# Patient Record
Sex: FEMALE | Race: WHITE | URBAN - METROPOLITAN AREA
[De-identification: names, ages, dates, MRNs, and addresses within clinical notes are randomized per-mention and may not be internally consistent; named-entity substitution may affect disease eponyms.]

---

## 2017-01-26 ENCOUNTER — ED HISTORICAL/CONVERTED ENCOUNTER (OUTPATIENT)
Dept: OTHER | Age: 32
End: 2017-01-26

## 2017-03-05 ENCOUNTER — ED HISTORICAL/CONVERTED ENCOUNTER (OUTPATIENT)
Dept: OTHER | Age: 32
End: 2017-03-05

## 2017-06-29 ENCOUNTER — OP HISTORICAL/CONVERTED ENCOUNTER (OUTPATIENT)
Dept: OTHER | Age: 32
End: 2017-06-29

## 2018-01-16 ENCOUNTER — ED HISTORICAL/CONVERTED ENCOUNTER (OUTPATIENT)
Dept: OTHER | Age: 33
End: 2018-01-16

## 2019-07-05 ENCOUNTER — ED HISTORICAL/CONVERTED ENCOUNTER (OUTPATIENT)
Dept: OTHER | Age: 34
End: 2019-07-05

## 2022-09-12 ENCOUNTER — TRANSCRIBE ORDER (OUTPATIENT)
Dept: SCHEDULING | Age: 37
End: 2022-09-12

## 2022-09-12 DIAGNOSIS — N63.0 LUMP OR MASS IN BREAST: Primary | ICD-10-CM

## 2022-09-22 ENCOUNTER — HOSPITAL ENCOUNTER (OUTPATIENT)
Dept: ULTRASOUND IMAGING | Age: 37
Discharge: HOME | End: 2022-09-22
Attending: NURSE PRACTITIONER
Payer: OTHER GOVERNMENT

## 2022-09-22 ENCOUNTER — HOSPITAL ENCOUNTER (OUTPATIENT)
Dept: MAMMOGRAPHY | Age: 37
Discharge: HOME | End: 2022-09-22
Attending: NURSE PRACTITIONER
Payer: OTHER GOVERNMENT

## 2022-09-22 DIAGNOSIS — N63.0 LUMP OR MASS IN BREAST: ICD-10-CM

## 2022-09-22 PROCEDURE — 76642 ULTRASOUND BREAST LIMITED: CPT

## 2022-09-22 PROCEDURE — 77062 BREAST TOMOSYNTHESIS BI: CPT

## 2023-10-03 ENCOUNTER — APPOINTMENT (OUTPATIENT)
Dept: URBAN - METROPOLITAN AREA CLINIC 278 | Age: 38
Setting detail: DERMATOLOGY
End: 2023-10-03

## 2023-10-03 DIAGNOSIS — Z71.89 OTHER SPECIFIED COUNSELING: ICD-10-CM

## 2023-10-03 DIAGNOSIS — L82.0 INFLAMED SEBORRHEIC KERATOSIS: ICD-10-CM

## 2023-10-03 DIAGNOSIS — L21.8 OTHER SEBORRHEIC DERMATITIS: ICD-10-CM

## 2023-10-03 PROBLEM — D23.9 OTHER BENIGN NEOPLASM OF SKIN, UNSPECIFIED: Status: ACTIVE | Noted: 2023-10-03

## 2023-10-03 PROCEDURE — OTHER ADDITIONAL NOTES: OTHER

## 2023-10-03 PROCEDURE — OTHER LIQUID NITROGEN: OTHER

## 2023-10-03 PROCEDURE — OTHER COUNSELING: OTHER

## 2023-10-03 PROCEDURE — 99204 OFFICE O/P NEW MOD 45 MIN: CPT | Mod: 25

## 2023-10-03 PROCEDURE — 17110 DESTRUCT B9 LESION 1-14: CPT

## 2023-10-03 PROCEDURE — OTHER MIPS QUALITY: OTHER

## 2023-10-03 PROCEDURE — OTHER PRESCRIPTION: OTHER

## 2023-10-03 RX ORDER — KETOCONAZOLE 20 MG/ML
SHAMPOO, SUSPENSION TOPICAL QD PRN
Qty: 120 | Refills: 3 | Status: ERX | COMMUNITY
Start: 2023-10-03

## 2023-10-03 ASSESSMENT — LOCATION ZONE DERM
LOCATION ZONE: FACE
LOCATION ZONE: SCALP
LOCATION ZONE: TRUNK

## 2023-10-03 ASSESSMENT — LOCATION SIMPLE DESCRIPTION DERM
LOCATION SIMPLE: LEFT LOWER BACK
LOCATION SIMPLE: POSTERIOR SCALP
LOCATION SIMPLE: LEFT CHEEK

## 2023-10-03 ASSESSMENT — SEVERITY ASSESSMENT: HOW SEVERE IS THIS PATIENT'S CONDITION?: MILD

## 2023-10-03 ASSESSMENT — LOCATION DETAILED DESCRIPTION DERM
LOCATION DETAILED: LEFT SUPERIOR PREAURICULAR CHEEK
LOCATION DETAILED: POSTERIOR MID-PARIETAL SCALP
LOCATION DETAILED: LEFT INFERIOR LATERAL MIDBACK

## 2023-10-03 NOTE — HPI: FULL BODY SKIN EXAMINATION
What Type Of Note Output Would You Prefer (Optional)?: Bullet Format
What Is The Reason For Today's Visit?: Full Body Skin Examination
What Is The Reason For Today's Visit? (Being Monitored For X): concerning skin lesions on a periodic basis
Additional History: Patient states that she has two areas that are raised and rough located on left temple and left lateral back

## 2023-10-03 NOTE — PROCEDURE: ADDITIONAL NOTES
ALLOPURINOL 100 MG TABLET  Last Written Prescription Date:  1/6/2021  Last Fill Quantity: 120,   # refills: 0  Last Office Visit :8/24/2020  Future Office visit:  None    Routing refill request to provider for review/approval because:  Abnormal Uric Acid Levels and Creatinine Levels  Refer to clinic for review     Recent Labs   Lab Test 01/17/20  0334   URIC 9.1*   If level is 6mg/dL or greater, ok to refill one time and refer to provider.         Normal serum creatinine on file in the past 12 months          Recent Labs   Lab Test 05/15/20  1028 01/15/20  2207 01/15/20  2207   CR 1.18*   < >  --    CREAT  --   --  0.9             Brigida Velsaquez RN  Central Triage Red Flags/Med Refills    
Render Risk Assessment In Note?: no
Detail Level: Simple
Additional Notes: Can address hair loss at f/u to see if it has improved with treatment of inflammation.

## 2024-03-05 ENCOUNTER — APPOINTMENT (OUTPATIENT)
Dept: URBAN - METROPOLITAN AREA CLINIC 278 | Age: 39
Setting detail: DERMATOLOGY
End: 2024-03-06

## 2024-03-05 DIAGNOSIS — L57.0 ACTINIC KERATOSIS: ICD-10-CM

## 2024-03-05 DIAGNOSIS — L28.0 LICHEN SIMPLEX CHRONICUS: ICD-10-CM

## 2024-03-05 DIAGNOSIS — Z71.89 OTHER SPECIFIED COUNSELING: ICD-10-CM

## 2024-03-05 PROCEDURE — 99214 OFFICE O/P EST MOD 30 MIN: CPT | Mod: 25

## 2024-03-05 PROCEDURE — OTHER MIPS QUALITY: OTHER

## 2024-03-05 PROCEDURE — OTHER PRESCRIPTION: OTHER

## 2024-03-05 PROCEDURE — 17000 DESTRUCT PREMALG LESION: CPT

## 2024-03-05 PROCEDURE — OTHER COUNSELING: OTHER

## 2024-03-05 PROCEDURE — OTHER REASSURANCE: OTHER

## 2024-03-05 PROCEDURE — OTHER LIQUID NITROGEN: OTHER

## 2024-03-05 RX ORDER — TACROLIMUS 1 MG/G
OINTMENT TOPICAL
Qty: 30 | Refills: 1 | Status: ERX | COMMUNITY
Start: 2024-03-05

## 2024-03-05 ASSESSMENT — LOCATION DETAILED DESCRIPTION DERM
LOCATION DETAILED: RIGHT MEDIAL CANTHUS
LOCATION DETAILED: LEFT MEDIAL CANTHUS
LOCATION DETAILED: RIGHT INFERIOR CENTRAL MALAR CHEEK

## 2024-03-05 ASSESSMENT — LOCATION SIMPLE DESCRIPTION DERM
LOCATION SIMPLE: LEFT EYELID
LOCATION SIMPLE: RIGHT EYELID
LOCATION SIMPLE: RIGHT CHEEK

## 2024-03-05 ASSESSMENT — LOCATION ZONE DERM
LOCATION ZONE: FACE
LOCATION ZONE: EYELID

## 2024-03-05 NOTE — PROCEDURE: LIQUID NITROGEN
Show Applicator Variable?: Yes
Number Of Freeze-Thaw Cycles: 2 freeze-thaw cycles
Render Note In Bullet Format When Appropriate: No
Duration Of Freeze Thaw-Cycle (Seconds): 10
Post-Care Instructions: I reviewed with the patient in detail post-care instructions. Patient is to wear sunprotection, and avoid picking at any of the treated lesions. Pt may apply Vaseline to crusted or scabbing areas.
Detail Level: Detailed
Consent: The patient's consent was obtained including but not limited to risks of crusting, scabbing, blistering, scarring, darker or lighter pigmentary change, recurrence, incomplete removal and infection.

## 2025-05-22 NOTE — H&P
performed.    COMPARISON:  None    BREAST COMPOSITION: The breast tissue is heterogeneously dense, which could  obscure detection of small masses (approximately 51-75% glandular).     FINDINGS:    Mammography:  Bilateral digital diagnostic mammography was performed, and is  interpreted in conjunction with a computer assisted detection (CAD) system. Spot  compression views of the LEFT breast in the area of clinical concern confirm the  presence of a mass. It is oval with circumscribed margins and isodense to breast  parenchyma situated at the 9:00 subareolar position.    There is a second mass in the upper outer quadrant of the LEFT breast which is  oval with circumscribed margins and isodense to breast parenchyma. There are a  few associated punctate calcifications.    On the RIGHT, there are biopsy site markers in the upper outer quadrant. There  is a mass in the upper outer quadrant of the RIGHT breast not associated with a  biopsy clip which is oval with circumscribed and partially obscured margins.    Ultrasound: On physical examination of the LEFT breast, I palpate a firm, mobile  nodule near the 10:00 subareolar position. On sonography, there is a simple cyst  at the 9-10:00 subareolar position which measures 1.5 x 1.1 x 1.4 cm  corresponding to the site of clinical concern.    On sonography of the LEFT breast upper outer quadrant, there is a simple cyst  measuring 2.0 x 1.6 x 2.1 cm.    On sonography of the RIGHT breast upper outer quadrant, there is a hypoechoic  mass with circumscribed margins measuring 1.1 x 0.5 x 0.8 cm at the 10:00  position 3 cm from the RIGHT nipple. It demonstrates a parallel orientation.  Impression: 1. LEFT breast: BI-RADS Category 2 - Benign findings. No radiographic evidence  of malignancy in the LEFT breast. There are simple cysts.    2. RIGHT breast: BI-RADS Category 2 - Benign findings. No radiographic evidence  of malignancy in the RIGHT breast. There is a probable

## 2025-05-22 NOTE — H&P (VIEW-ONLY)
This 40 y.o. year old female presents with complaints of  chronic right foot pain.          Conservative measures have been exhausted prior to the decision for surgery. Patient has discussed the risks, alternatives, and benefits of the surgery with surgeon and has elected to proceed with surgical intervention.    Type of surgery : Procedure(s) (LRB):  HALLUX VALGUS REPAIR WITH DOUBLE OSTEOTOMY - RIGHT FOOT, 2ND METATARSAL OSTEOTOMY - RIGHT FOOT (Right)  Date of procedure:  6/6/25  Surgeon: Primary: Chong Negrete DPM     PCP: Soniya Peralta APRN - NP   Specialists: Dr. Bourgeois (Psychiatry)      LMP (If applicable): 5/15/25      Hx of Psychological Disorders: Yes    Hx of Seizure: No      Hx of Cancer: No    Hx of Autoimmune Disorders: No    Hx of Anemias: No    Hx of Blood Transfusions: No    Personal Hx of Blood Clotting Disorder/DVT/PE: No    Family Hx of Blood Clotting Disorder/DVT/PE: No    Hx of Hormone Disorders: No    Hx of DM: No     Hx of CVA/TIA: No     Hearing Aids: No     Implantable Devices (Defibrillator, Pacemaker, nerve stimulator, portacath, insulin pump, continuous glucose monitor, etc): No        AICD/Pacemaker (If so, has it been interrogated and/or battery change within the last 6 months?)? No        Coronary Artery Stent Placement within last 6 months? No       Hx of Rheumatic Fever/HTN/HLD/CAD/CHF/Cardiomyopathy/Arrhythmia/Heart Valve Abnormalities/Aneurysm: No    Hx of TB/Asthma/COPD/CAREY: No    Hx of GERD: No     Hx of Liver Disease: No     Known Kidney Disease (including dialysis recipient)? No      Frequent UTIs: No     Abx used in the past: N/A    Requires Diflucan after abx: No     History of MRSA/MSSA: Yes-MRSA    Recent ED/Hospitalizations/Urgent Care visits: No        Acute exacerbations/Infections: No        Anesthesia type: MAC    Prior reactions to anesthesia:  Yes-episode of hypoxia     Personal History of Pseudocholinesterase Deficiency: No    Family History of

## 2025-05-28 ENCOUNTER — HOSPITAL ENCOUNTER (OUTPATIENT)
Facility: HOSPITAL | Age: 40
Discharge: HOME | End: 2025-05-31

## 2025-05-28 VITALS
BODY MASS INDEX: 32.38 KG/M2 | HEIGHT: 67 IN | DIASTOLIC BLOOD PRESSURE: 88 MMHG | OXYGEN SATURATION: 98 % | RESPIRATION RATE: 16 BRPM | TEMPERATURE: 97.7 F | SYSTOLIC BLOOD PRESSURE: 135 MMHG | WEIGHT: 206.3 LBS | HEART RATE: 91 BPM

## 2025-05-28 RX ORDER — VENLAFAXINE HYDROCHLORIDE 150 MG/1
150 CAPSULE, EXTENDED RELEASE ORAL EVERY MORNING
COMMUNITY

## 2025-05-28 RX ORDER — VENLAFAXINE 37.5 MG/1
37.5 TABLET ORAL EVERY MORNING
COMMUNITY

## 2025-05-28 RX ORDER — LAMOTRIGINE 25 MG/1
25 TABLET ORAL EVERY MORNING
COMMUNITY

## 2025-05-28 RX ORDER — CLONAZEPAM 0.5 MG/1
0.5 TABLET ORAL 2 TIMES DAILY PRN
COMMUNITY

## 2025-05-28 ASSESSMENT — ENCOUNTER SYMPTOMS
NAUSEA: 0
VOICE CHANGE: 0
WHEEZING: 0
COLOR CHANGE: 0
RHINORRHEA: 0
CHOKING: 0
COUGH: 0
TROUBLE SWALLOWING: 0
STRIDOR: 0
CHEST TIGHTNESS: 0
SHORTNESS OF BREATH: 0
SORE THROAT: 0
APNEA: 0
CONSTIPATION: 0
SINUS PRESSURE: 0
SINUS PAIN: 0

## 2025-05-28 ASSESSMENT — PAIN DESCRIPTION - LOCATION: LOCATION: FOOT

## 2025-05-28 ASSESSMENT — PAIN SCALES - GENERAL: PAINLEVEL_OUTOF10: 4

## 2025-05-28 ASSESSMENT — PAIN DESCRIPTION - PAIN TYPE: TYPE: CHRONIC PAIN

## 2025-05-28 ASSESSMENT — PAIN DESCRIPTION - ORIENTATION: ORIENTATION: RIGHT

## 2025-05-28 ASSESSMENT — PAIN - FUNCTIONAL ASSESSMENT: PAIN_FUNCTIONAL_ASSESSMENT: PREVENTS OR INTERFERES SOME ACTIVE ACTIVITIES AND ADLS

## 2025-05-28 NOTE — DISCHARGE INSTRUCTIONS
[facility]                   [address] [place] [address]   MAIN OR                                  [phone]   MAIN PRE OP                          [phone]                                                                                AMBULATORY PRE OP          [phone]  PRE-ADMISSION TESTING    [phone]     Surgery Date:  Friday 6/6/2025      INSTRUCTIONS BEFORE YOUR SURGERY   Arrival Time [facility] Pre-op staff will call you between 3 and 7pm the day before your surgery with your arrival time. If your surgery is on a Monday, they will call you the Friday before. If it's after 7pm the day prior to surgery and you have not received a time yet, please call [phone].   Where to Check In   Come through the Main Hospital entrance. Take the elevators on the left side of the lobby to the 2nd floor. The admitting desk will be on your right.     Please bring the following items to register:  photo ID, insurance card, co-pay if needed, medical directive, DNR, and/or POA if applicable.     Free [employer] parking is available 7am to 5pm.   Food Drink Alcohol Marijuana    No food or drink (gum, mints, coffee, juice, etc) after midnight the night before surgery.  EXCEPTION:  You may drink WATER ONLY up until 2 hours prior to your surgery time. Please do not add anything to your water as this may result in your surgery being postponed.       No alcohol (beer, wine, liquor) or marijuana 24 hours before or after surgery.      PRE-OP Medication Instructions      MEDICATIONS TO TAKE THE MORNING OF SURGERY WITH WATER:   Venlafaxine, lamotrigine, and clonazepam if needed     Medications to STOP 7 Days Before Surgery Non-Steroidal anti-inflammatory Drugs (NSAID's): for example: Ibuprofen, Advil, Motrin, Naproxen, Aleve, Diclofenac  Aspirin and Aspirin containing products (BC Powder, Excedrin, etc.)  Weight loss and/or diabetic GLP1 medications (Wegovy, Ozempic,

## 2025-05-30 LAB
BACTERIA SPEC CULT: NORMAL
BACTERIA SPEC CULT: NORMAL
SERVICE CMNT-IMP: NORMAL

## 2025-06-06 ENCOUNTER — HOSPITAL ENCOUNTER (OUTPATIENT)
Facility: HOSPITAL | Age: 40
Setting detail: OUTPATIENT SURGERY
Discharge: HOME | End: 2025-06-06
Attending: PODIATRIST | Admitting: PODIATRIST
Payer: OTHER GOVERNMENT

## 2025-06-06 ENCOUNTER — APPOINTMENT (OUTPATIENT)
Facility: HOSPITAL | Age: 40
End: 2025-06-06
Attending: PODIATRIST
Payer: OTHER GOVERNMENT

## 2025-06-06 ENCOUNTER — ANESTHESIA (OUTPATIENT)
Facility: HOSPITAL | Age: 40
End: 2025-06-06
Payer: OTHER GOVERNMENT

## 2025-06-06 ENCOUNTER — HOSPITAL ENCOUNTER (OUTPATIENT)
Facility: HOSPITAL | Age: 40
Discharge: HOME | End: 2025-06-09

## 2025-06-06 ENCOUNTER — ANESTHESIA EVENT (OUTPATIENT)
Facility: HOSPITAL | Age: 40
End: 2025-06-06
Payer: OTHER GOVERNMENT

## 2025-06-06 VITALS
BODY MASS INDEX: 31.97 KG/M2 | WEIGHT: 203.71 LBS | HEIGHT: 67 IN | DIASTOLIC BLOOD PRESSURE: 68 MMHG | SYSTOLIC BLOOD PRESSURE: 104 MMHG | TEMPERATURE: 98.6 F | HEART RATE: 71 BPM | RESPIRATION RATE: 16 BRPM | OXYGEN SATURATION: 98 %

## 2025-06-06 LAB — HCG UR QL: NEGATIVE

## 2025-06-06 PROCEDURE — 2709999900 HC NON-CHARGEABLE SUPPLY: Performed by: PODIATRIST

## 2025-06-06 PROCEDURE — 7100000010 HC PHASE II RECOVERY - FIRST 15 MIN: Performed by: PODIATRIST

## 2025-06-06 PROCEDURE — 6360000002 HC RX W HCPCS: Performed by: PODIATRIST

## 2025-06-06 PROCEDURE — 2500000003 HC RX 250 WO HCPCS: Performed by: NURSE ANESTHETIST, CERTIFIED REGISTERED

## 2025-06-06 PROCEDURE — 2720000010 HC SURG SUPPLY STERILE: Performed by: PODIATRIST

## 2025-06-06 PROCEDURE — 7100000000 HC PACU RECOVERY - FIRST 15 MIN: Performed by: PODIATRIST

## 2025-06-06 PROCEDURE — 81025 URINE PREGNANCY TEST: CPT

## 2025-06-06 PROCEDURE — C1713 ANCHOR/SCREW BN/BN,TIS/BN: HCPCS | Performed by: PODIATRIST

## 2025-06-06 PROCEDURE — 6360000002 HC RX W HCPCS: Performed by: NURSE ANESTHETIST, CERTIFIED REGISTERED

## 2025-06-06 PROCEDURE — 3700000000 HC ANESTHESIA ATTENDED CARE: Performed by: PODIATRIST

## 2025-06-06 PROCEDURE — 7100000001 HC PACU RECOVERY - ADDTL 15 MIN: Performed by: PODIATRIST

## 2025-06-06 PROCEDURE — 3700000001 HC ADD 15 MINUTES (ANESTHESIA): Performed by: PODIATRIST

## 2025-06-06 PROCEDURE — 7100000011 HC PHASE II RECOVERY - ADDTL 15 MIN: Performed by: PODIATRIST

## 2025-06-06 PROCEDURE — 2580000003 HC RX 258: Performed by: ANESTHESIOLOGY

## 2025-06-06 PROCEDURE — 3600000003 HC SURGERY LEVEL 3 BASE: Performed by: PODIATRIST

## 2025-06-06 PROCEDURE — 3600000013 HC SURGERY LEVEL 3 ADDTL 15MIN: Performed by: PODIATRIST

## 2025-06-06 DEVICE — BEVELED SCREW
Type: IMPLANTABLE DEVICE | Site: FOOT | Status: FUNCTIONAL
Brand: MIS BEVELED SCREW

## 2025-06-06 RX ORDER — ONDANSETRON 2 MG/ML
INJECTION INTRAMUSCULAR; INTRAVENOUS
Status: DISCONTINUED | OUTPATIENT
Start: 2025-06-06 | End: 2025-06-06 | Stop reason: SDUPTHER

## 2025-06-06 RX ORDER — DEXMEDETOMIDINE HYDROCHLORIDE 100 UG/ML
INJECTION, SOLUTION INTRAVENOUS
Status: DISCONTINUED | OUTPATIENT
Start: 2025-06-06 | End: 2025-06-06 | Stop reason: SDUPTHER

## 2025-06-06 RX ORDER — DEXAMETHASONE SODIUM PHOSPHATE 4 MG/ML
INJECTION, SOLUTION INTRA-ARTICULAR; INTRALESIONAL; INTRAMUSCULAR; INTRAVENOUS; SOFT TISSUE
Status: DISCONTINUED | OUTPATIENT
Start: 2025-06-06 | End: 2025-06-06 | Stop reason: SDUPTHER

## 2025-06-06 RX ORDER — SODIUM CHLORIDE, SODIUM LACTATE, POTASSIUM CHLORIDE, CALCIUM CHLORIDE 600; 310; 30; 20 MG/100ML; MG/100ML; MG/100ML; MG/100ML
INJECTION, SOLUTION INTRAVENOUS CONTINUOUS
Status: DISCONTINUED | OUTPATIENT
Start: 2025-06-06 | End: 2025-06-06 | Stop reason: HOSPADM

## 2025-06-06 RX ORDER — ONDANSETRON 2 MG/ML
4 INJECTION INTRAMUSCULAR; INTRAVENOUS
Status: DISCONTINUED | OUTPATIENT
Start: 2025-06-06 | End: 2025-06-06 | Stop reason: HOSPADM

## 2025-06-06 RX ORDER — CEFAZOLIN SODIUM 1 G/3ML
INJECTION, POWDER, FOR SOLUTION INTRAMUSCULAR; INTRAVENOUS
Status: DISCONTINUED | OUTPATIENT
Start: 2025-06-06 | End: 2025-06-06 | Stop reason: SDUPTHER

## 2025-06-06 RX ORDER — LIDOCAINE HYDROCHLORIDE 10 MG/ML
1 INJECTION, SOLUTION EPIDURAL; INFILTRATION; INTRACAUDAL; PERINEURAL
Status: DISCONTINUED | OUTPATIENT
Start: 2025-06-06 | End: 2025-06-06 | Stop reason: HOSPADM

## 2025-06-06 RX ORDER — DIPHENHYDRAMINE HYDROCHLORIDE 50 MG/ML
12.5 INJECTION, SOLUTION INTRAMUSCULAR; INTRAVENOUS
Status: DISCONTINUED | OUTPATIENT
Start: 2025-06-06 | End: 2025-06-06 | Stop reason: HOSPADM

## 2025-06-06 RX ORDER — FENTANYL CITRATE 50 UG/ML
INJECTION, SOLUTION INTRAMUSCULAR; INTRAVENOUS
Status: DISCONTINUED | OUTPATIENT
Start: 2025-06-06 | End: 2025-06-06 | Stop reason: SDUPTHER

## 2025-06-06 RX ORDER — BUPIVACAINE HYDROCHLORIDE 5 MG/ML
INJECTION, SOLUTION PERINEURAL PRN
Status: DISCONTINUED | OUTPATIENT
Start: 2025-06-06 | End: 2025-06-06 | Stop reason: HOSPADM

## 2025-06-06 RX ORDER — FENTANYL CITRATE 50 UG/ML
100 INJECTION, SOLUTION INTRAMUSCULAR; INTRAVENOUS
Status: DISCONTINUED | OUTPATIENT
Start: 2025-06-06 | End: 2025-06-06 | Stop reason: HOSPADM

## 2025-06-06 RX ORDER — MIDAZOLAM HYDROCHLORIDE 1 MG/ML
INJECTION, SOLUTION INTRAMUSCULAR; INTRAVENOUS
Status: DISCONTINUED | OUTPATIENT
Start: 2025-06-06 | End: 2025-06-06 | Stop reason: SDUPTHER

## 2025-06-06 RX ORDER — PROPOFOL 10 MG/ML
INJECTION, EMULSION INTRAVENOUS
Status: DISCONTINUED | OUTPATIENT
Start: 2025-06-06 | End: 2025-06-06 | Stop reason: SDUPTHER

## 2025-06-06 RX ORDER — NALOXONE HYDROCHLORIDE 0.4 MG/ML
INJECTION, SOLUTION INTRAMUSCULAR; INTRAVENOUS; SUBCUTANEOUS PRN
Status: DISCONTINUED | OUTPATIENT
Start: 2025-06-06 | End: 2025-06-06 | Stop reason: HOSPADM

## 2025-06-06 RX ORDER — MIDAZOLAM HYDROCHLORIDE 2 MG/2ML
2 INJECTION, SOLUTION INTRAMUSCULAR; INTRAVENOUS
Status: DISCONTINUED | OUTPATIENT
Start: 2025-06-06 | End: 2025-06-06 | Stop reason: HOSPADM

## 2025-06-06 RX ADMIN — DEXMEDETOMIDINE 4 MCG: 100 INJECTION, SOLUTION INTRAVENOUS at 09:25

## 2025-06-06 RX ADMIN — DEXMEDETOMIDINE 8 MCG: 100 INJECTION, SOLUTION INTRAVENOUS at 08:52

## 2025-06-06 RX ADMIN — DEXMEDETOMIDINE 4 MCG: 100 INJECTION, SOLUTION INTRAVENOUS at 09:00

## 2025-06-06 RX ADMIN — SODIUM CHLORIDE, SODIUM LACTATE, POTASSIUM CHLORIDE, AND CALCIUM CHLORIDE: .6; .31; .03; .02 INJECTION, SOLUTION INTRAVENOUS at 07:34

## 2025-06-06 RX ADMIN — MIDAZOLAM HYDROCHLORIDE 2 MG: 1 INJECTION, SOLUTION INTRAMUSCULAR; INTRAVENOUS at 08:51

## 2025-06-06 RX ADMIN — DEXMEDETOMIDINE 4 MCG: 100 INJECTION, SOLUTION INTRAVENOUS at 10:34

## 2025-06-06 RX ADMIN — DEXAMETHASONE SODIUM PHOSPHATE 4 MG: 4 INJECTION, SOLUTION INTRAMUSCULAR; INTRAVENOUS at 09:00

## 2025-06-06 RX ADMIN — FENTANYL CITRATE 50 MCG: 50 INJECTION, SOLUTION INTRAMUSCULAR; INTRAVENOUS at 08:57

## 2025-06-06 RX ADMIN — ONDANSETRON HYDROCHLORIDE 4 MG: 2 SOLUTION INTRAMUSCULAR; INTRAVENOUS at 08:52

## 2025-06-06 RX ADMIN — PROPOFOL 150 MCG/KG/MIN: 10 INJECTION, EMULSION INTRAVENOUS at 08:57

## 2025-06-06 RX ADMIN — CEFAZOLIN 2 G: 1 INJECTION, POWDER, FOR SOLUTION INTRAMUSCULAR; INTRAVENOUS at 09:16

## 2025-06-06 RX ADMIN — FENTANYL CITRATE 50 MCG: 50 INJECTION, SOLUTION INTRAMUSCULAR; INTRAVENOUS at 09:25

## 2025-06-06 ASSESSMENT — PAIN SCALES - GENERAL
PAINLEVEL_OUTOF10: 0
PAINLEVEL_OUTOF10: 0

## 2025-06-06 ASSESSMENT — PAIN - FUNCTIONAL ASSESSMENT: PAIN_FUNCTIONAL_ASSESSMENT: 0-10

## 2025-06-06 ASSESSMENT — PAIN DESCRIPTION - DESCRIPTORS: DESCRIPTORS: ACHING

## 2025-06-06 NOTE — ANESTHESIA POSTPROCEDURE EVALUATION
Department of Anesthesiology  Postprocedure Note    Patient: Brigid Rowe  MRN: [id number]  YOB: 1985  Date of evaluation: 6/6/2025    Procedure Summary       Date: 06/06/25 Room / Location: [facility] MAIN OR  / [facility] MAIN OR    Anesthesia Start: 0851 Anesthesia Stop: 1054    Procedure: HALLUX VALGUS REPAIR WITH DOUBLE OSTEOTOMY - RIGHT FOOT, 2ND METATARSAL OSTEOTOMY - RIGHT FOOT (Right: Foot) Diagnosis:       Hallux valgus, right      Plantar flexed metatarsal bone of right foot      (Hallux valgus, right [M20.11])      (Plantar flexed metatarsal bone of right foot [M21.6X1])    Surgeons: Chong Negrete DPM Responsible Provider: Tiago Merida DO    Anesthesia Type: MAC ASA Status: 2            Anesthesia Type: MAC    Francois Phase I: Francois Score: 10    Francois Phase II:      Anesthesia Post Evaluation    Patient location during evaluation: PACU  Patient participation: complete - patient participated  Level of consciousness: awake  Airway patency: patent  Nausea & Vomiting: no vomiting and no nausea  Cardiovascular status: hemodynamically stable  Respiratory status: acceptable  Hydration status: stable  Comments: Patient seen and examined.  Ready for discharge from PACU.  Pain management: adequate    No notable events documented.

## 2025-06-06 NOTE — ANESTHESIA PRE PROCEDURE
Department of Anesthesiology  Preprocedure Note       Name:  Brigid Rowe   Age:  40 y.o.  :  1985                                          MRN:  [id number]         Date:  2025      Surgeon: Surgeon(s):  Chong Negrete DPM    Procedure: Procedure(s):  HALLUX VALGUS REPAIR WITH DOUBLE OSTEOTOMY - RIGHT FOOT, 2ND METATARSAL OSTEOTOMY - RIGHT FOOT    Medications prior to admission:   Prior to Admission medications    Medication Sig Start Date End Date Taking? Authorizing Provider   venlafaxine (EFFEXOR XR) 150 MG extended release capsule Take 1 capsule by mouth every morning   Yes Daniel Giron MD   venlafaxine (EFFEXOR) 37.5 MG tablet Take 1 tablet by mouth every morning   Yes Daniel Giron MD   lamoTRIgine (LAMICTAL) 25 MG tablet Take 1 tablet by mouth every morning   Yes Daniel Giron MD   clonazePAM (KLONOPIN) 0.5 MG tablet Take 1 tablet by mouth 2 times daily as needed for Anxiety. Max Daily Amount: 1 mg    ProviderDaniel MD       Current medications:    Current Facility-Administered Medications   Medication Dose Route Frequency Provider Last Rate Last Admin   • lidocaine PF 1 % injection 1 mL  1 mL IntraDERmal Once PRN Margaux Faustin MD       • fentaNYL (SUBLIMAZE) injection 100 mcg  100 mcg IntraVENous Once PRN Margaux Faustin MD       • lactated ringers infusion   IntraVENous Continuous Margaux Faustin  mL/hr at 25 0734 New Bag at 25 0734   • midazolam PF (VERSED) injection 2 mg  2 mg IntraVENous Once PRN Margaux Faustin MD           Allergies:  No Known Allergies    Problem List:  There is no problem list on file for this patient.      Past Medical History:        Diagnosis Date   • Adverse effect of anesthesia     hypoxia during intra-op   • Anxiety    • Depression    • Hallux valgus, right    • MRSA (methicillin resistant Staphylococcus aureus)    • Plantar flexed metatarsal bone of right foot        Past Surgical History:

## 2025-06-06 NOTE — BRIEF OP NOTE
Brief Postoperative Note      Patient: Brigid Rowe  YOB: 1985  MRN: [id number]    Date of Procedure: 6/6/2025    Pre-Op Diagnosis Codes:      * Hallux valgus, right [M20.11]     * Plantar flexed metatarsal bone of right foot [M21.6X1]    Post-Op Diagnosis: Same       Procedure(s):  HALLUX VALGUS REPAIR WITH DOUBLE OSTEOTOMY - RIGHT FOOT, 2ND METATARSAL OSTEOTOMY - RIGHT FOOT    Surgeon(s):  Chong Negrete DPM    Assistant:  Surgical Assistant: Hema Rueda    Anesthesia: Monitor Anesthesia Care    Estimated Blood Loss (mL): Minimal    Complications: None    Specimens:   * No specimens in log *    Implants:  Implant Name Type Inv. Item Serial No.  Lot No. LRB No. Used Action   SCREW BNE L 48 MM GORGE 4 MM MIS BVL STRL - SNA  SCREW BNE L 48 MM GORGE 4 MM MIS BVL STRL [facility] [id number] Right 1 Implanted   SCREW BNE L 38 MM GORGE 3 MM MIS BVL STRL - SNA  SCREW BNE L 38 MM GORGE 3 MM MIS BVL STRL NA [facility] [id number] Right 1 Implanted   SCREW BNE L 24 MM GORGE 3 MM MIS BVL STRL - SNA  SCREW BNE L 24 MM GORGE 3 MM MIS BVL STRL NA [employer] [id number] Right 1 Implanted         Drains: * No LDAs found *    Findings:  Infection Present At Time Of Surgery (PATOS) (choose all levels that have infection present):  No infection present  Other Findings: hallux valgus right foot, plantarflexed 2nd metatarsal right foot    Electronically signed by Chong Negrete DPM on 6/6/2025 at 10:58 AM

## 2025-06-06 NOTE — PERIOP NOTE
Pt was discharged prior to post-operative xray.  Notified Dr. Negrete via perfect serve and attempted to resolve the issue. His response was \"nothing to do.\"  Apologized multiple times and Charge RN is aware.     Of note, order was not released by this RN.

## 2025-06-06 NOTE — DISCHARGE INSTRUCTIONS
Dr. Chong Negrete, DPM FACFAS FACFAOM FACCWS    [facility]  Post Operative Instructions:    You have had a surgical procedure on your right foot.      Fluids and Diet:  Begin with clear liquids, broth, dry toast, and crackers.  If not nauseated then resume your regular pre-operative diet when you are ready    Medications:  Take your prescriptions as directed  If your pain is not severe then you may take the non-prescription medication that you normally take for aches and pains  You may resume your regularly scheduled medications (unless otherwise directed)  If any side effects or adverse reactions occur, discontinue the medication and contact your doctor.  Review the patient drug information that is provided before you take any medication    Ambulation and Activity:  You are advised to go directly home from the hospital  Use crutches, walker as needed  You may put weight on the operated foot.  You should wear the surgical shoe at all times when awake.  Avoid stairs if possible.  Do not lift or move heavy objects  Do not drive until cleared by Dr. Negrete    Bandage and Wound Care Instructions:  Keep bandage clean and dry  Do not shower or bathe the operative extremity  Do not remove the bandage (unless otherwise directed)  Do not attempt to put anything between the cast or dressing and your skin, some itching is normal.    Ice and Elevation:  Elevate operative extremity as much as possible to reduce swelling and discomfort.  Elevate with 2 pillows at or above the level of the heart for the first 72 hours.  Ice:  Apply Hospital dispensed insulated ice bag over the bandage 20 minutes of every hour while awake for the first 72 hours.  You may behind the knee as well.    Special Instructions: Call your doctor immediately if you develop any of the following.  Fever over 100 degrees by mouth - take your temperature daily until your first follow up visit.  Pain not relieved by medication ordered  Swelling,

## 2025-06-06 NOTE — OP NOTE
Operative Note      Patient: Brigid Rowe  YOB: 1985  MRN: [id number]    Date of Procedure: 6/6/2025    Pre-Op Diagnosis Codes:      * Hallux valgus, right [M20.11]     * Plantar flexed metatarsal bone of right foot [M21.6X1]    Post-Op Diagnosis: {MH OR SAME:[id number]}       Procedure(s):  HALLUX VALGUS REPAIR WITH DOUBLE OSTEOTOMY - RIGHT FOOT, 2ND METATARSAL OSTEOTOMY - RIGHT FOOT    Surgeon(s):  Chong Negrete DPM    Assistant:   Surgical Assistant: Hema Rueda    Anesthesia: Monitor Anesthesia Care    Estimated Blood Loss (mL): {NUMBERS; EBL:75480}    Complications: {Symptoms; Intra-op complications:29313}    Specimens:   * No specimens in log *    Implants:  Implant Name Type Inv. Item Serial No.  Lot No. LRB No. Used Action   SCREW BNE L 48 MM GORGE 4 MM MIS BVL STRL - SNA  SCREW BNE L 48 MM GORGE 4 MM MIS BVL STRL NA [employer] [id number] Right 1 Implanted   SCREW BNE L 38 MM GORGE 3 MM MIS BVL STRL - SNA  SCREW BNE L 38 MM GORGE 3 MM MIS BVL STRL NA [employer]- [id number] Right 1 Implanted   SCREW BNE L 24 MM GORGE 3 MM MIS BVL STRL - SNA  SCREW BNE L 24 MM GORGE 3 MM MIS BVL STRL NA [employer]- [id number] Right 1 Implanted         Drains: * No LDAs found *    Findings:  Infection Present At Time Of Surgery (PATOS) (choose all levels that have infection present):  {PATOS LEVELS:114669057}  Other Findings: ***    Detailed Description of Procedure:   ***    Electronically signed by Chong Negrete DPM on 6/6/2025 at 10:59 AM     resolution of her symptoms.  Due to failed nonsurgical treatment options I discussed and recommended surgical correction.  I discussed procedure at length including the expected postoperative course, risks, alternatives, and possible complications.  No guarantees were given and the patient elected to proceed.    This 40-year-old female was brought back to the operating room and placed supine on the operating table.  After adequate IV sedation the right lower extremity was prepped and draped in usual aseptic fashion.      Next attention was directed to the medial eminence of the first metatarsophalangeal joint of the right foot.  Fluoroscopy was used to identify the metaphysis of the first metatarsal distally.  Next a small stab incision was made at this level.  Next soft tissue attachments were dissected free to expose the medial aspect of the first metatarsal.  Next a sidecutting son was used to create a transverse osteotomy allowing for lateral shift of the capital fragment as well as rotation.  The capital fragment was shifted greater than 80% aligning the sesamoids in the appropriate position.  Next this was fixated using fully threaded headless screws from [employer] in oblique fashion.  Fixation and alignment was confirmed using fluoroscopy.  The wounds were then irrigated with copious amounts of normal saline and closed in layers.  The medial eminence was then resected using the son.     Next attention was directed to the proximal phalanx which exhibited a lateral deviation.  Stab incision was made at the proximal metaphysis medially.  This was deepened down to the bone.  Next a closing wedge osteotomy was performed and then fixated with a headless screw placed in oblique fashion.  Fluoroscopy was used to confirm fixation and reduction of the deformity.  Wound was then irrigated and closed in layers.      Next attention was directed to the plantar flex second metatarsal which was noted clinically.  A small stab

## (undated) DEVICE — CANISTER, RIGID, 3000CC: Brand: MEDLINE INDUSTRIES, INC.

## (undated) DEVICE — Device

## (undated) DEVICE — K-WIRE: Brand: K-WIRE

## (undated) DEVICE — ZIMMER® STERILE DISPOSABLE TOURNIQUET CUFF WITH PROTECTIVE SLEEVE AND PLC, DUAL PORT, SINGLE BLADDER, 18 IN. (46 CM)

## (undated) DEVICE — SUTURE VICRYL SZ 4-0 L27IN ABSRB UD L26MM SH 1/2 CIR J415H

## (undated) DEVICE — PRECISION OFFSET (9.0 X 0.254 X 25.0MM)

## (undated) DEVICE — DRESSING PETRO W3XL3IN OIL EMUL N ADH GZ KNIT IMPREG CELOS

## (undated) DEVICE — PREP KIT PEEL PTCH POVIDONE IOD

## (undated) DEVICE — BANDAGE COBAN 4 IN COMPR W4INXL5YD FOAM COHESIVE QUIK STK SELF ADH SFT

## (undated) DEVICE — PADDING CAST W4INXL4YD ST COT RAYON MICROPLEATED HIGHLY

## (undated) DEVICE — EXTREMITY-SFMCASU: Brand: MEDLINE INDUSTRIES, INC.

## (undated) DEVICE — SUTURE VICRYL + SZ 3-0 L27IN ABSRB UD L26MM SH 1/2 CIR VCP416H

## (undated) DEVICE — BANDAGE COMPR W4INXL5YD WHT BGE POLY COT M E WRP WV HK AND

## (undated) DEVICE — GLOVE SURG SZ 8 L12IN FNGR THK79MIL GRN LTX FREE

## (undated) DEVICE — SOLUTION IRRIG 1000ML H2O PIC PLAS SHATTERPROOF CONTAINER

## (undated) DEVICE — SUTURE N ABSRB L 18 IN SZ 4-0 NDL L 19 MM NYL MONOFILAMENT

## (undated) DEVICE — SOLUTION IV 500ML 0.9% SOD BOTTLE CHL LTWT DURABLE SHATTERPROOF

## (undated) DEVICE — GOWN,SIRUS,NONRNF,SETINSLV,2XL,18/CS: Brand: MEDLINE

## (undated) DEVICE — GLOVE ORANGE PI 8   MSG9080

## (undated) DEVICE — SPONGE GZ W4XL4IN COT 12 PLY TYP VII WVN C FLD DSGN STERILE

## (undated) DEVICE — BANDAGE COMPR W6INXL10YD ST M E WHITE/BEIGE

## (undated) DEVICE — DRAPE C ARM W/ PLT PROTCT NEO